# Patient Record
Sex: MALE | Race: WHITE | NOT HISPANIC OR LATINO | Employment: FULL TIME | URBAN - METROPOLITAN AREA
[De-identification: names, ages, dates, MRNs, and addresses within clinical notes are randomized per-mention and may not be internally consistent; named-entity substitution may affect disease eponyms.]

---

## 2018-08-13 ENCOUNTER — CLINICAL SUPPORT (OUTPATIENT)
Dept: INTERNAL MEDICINE | Facility: CLINIC | Age: 18
End: 2018-08-13

## 2018-08-13 VITALS
DIASTOLIC BLOOD PRESSURE: 63 MMHG | WEIGHT: 278 LBS | SYSTOLIC BLOOD PRESSURE: 136 MMHG | HEART RATE: 70 BPM | BODY MASS INDEX: 36.84 KG/M2 | HEIGHT: 73 IN

## 2018-08-13 DIAGNOSIS — Z00.00 ROUTINE GENERAL MEDICAL EXAMINATION AT A HEALTH CARE FACILITY: Primary | ICD-10-CM

## 2018-08-13 PROCEDURE — 94010 BREATHING CAPACITY TEST: CPT | Mod: ,,, | Performed by: NURSE PRACTITIONER

## 2018-08-15 NOTE — PROCEDURES
DATE OF PROCEDURE:  08/13/2018.    REFERRING EXAMINER:  Rickie POPE at [facility]    Spirometry shows an FVC of 116% and FEV1 of 116% and the FEV1/FVC of 99%  and the [id number] of 108%.    CONCLUSION:  Normal spirometry.        STEPHEN/PEYMAN dd: 08/15/2018 06:24:01 (CDT)   td: 08/15/2018 06:58:39 (CDT)  Doc ID #[id number]   Job ID #[id number]    CC:

## 2018-09-07 DIAGNOSIS — Z00.00 ROUTINE GENERAL MEDICAL EXAMINATION AT A HEALTH CARE FACILITY: Primary | ICD-10-CM

## 2018-09-12 ENCOUNTER — CLINICAL SUPPORT (OUTPATIENT)
Dept: INTERNAL MEDICINE | Facility: CLINIC | Age: 18
End: 2018-09-12

## 2018-09-12 DIAGNOSIS — Z00.00 ROUTINE GENERAL MEDICAL EXAMINATION AT A HEALTH CARE FACILITY: ICD-10-CM

## 2018-09-12 LAB
ALBUMIN SERPL BCP-MCNC: 4.8 G/DL
ALP SERPL-CCNC: 54 U/L
ALT SERPL W/O P-5'-P-CCNC: 29 U/L
ANION GAP SERPL CALC-SCNC: 9 MMOL/L
AST SERPL-CCNC: 26 U/L
BASOPHILS # BLD AUTO: 0.06 K/UL
BASOPHILS NFR BLD: 0.9 %
BILIRUB SERPL-MCNC: 0.3 MG/DL
BILIRUB UR QL STRIP: NEGATIVE
BUN SERPL-MCNC: 14 MG/DL
CALCIUM SERPL-MCNC: 9.6 MG/DL
CHLORIDE SERPL-SCNC: 102 MMOL/L
CHOLEST SERPL-MCNC: 194 MG/DL
CHOLEST/HDLC SERPL: 5.2 {RATIO}
CLARITY UR: CLEAR
CO2 SERPL-SCNC: 27 MMOL/L
COLOR UR: YELLOW
CREAT SERPL-MCNC: 1 MG/DL
DIFFERENTIAL METHOD: NORMAL
EOSINOPHIL # BLD AUTO: 0.2 K/UL
EOSINOPHIL NFR BLD: 3.6 %
ERYTHROCYTE [DISTWIDTH] IN BLOOD BY AUTOMATED COUNT: 13.1 %
EST. GFR  (AFRICAN AMERICAN): >60 ML/MIN/1.73 M^2
EST. GFR  (NON AFRICAN AMERICAN): >60 ML/MIN/1.73 M^2
GLUCOSE SERPL-MCNC: 95 MG/DL
GLUCOSE UR QL STRIP: NEGATIVE
HCT VFR BLD AUTO: 47.2 %
HDLC SERPL-MCNC: 37 MG/DL
HDLC SERPL: 19.1 %
HGB BLD-MCNC: 15.5 G/DL
HGB UR QL STRIP: NEGATIVE
IMM GRANULOCYTES # BLD AUTO: 0.03 K/UL
IMM GRANULOCYTES NFR BLD AUTO: 0.5 %
KETONES UR QL STRIP: NEGATIVE
LDLC SERPL CALC-MCNC: 144.4 MG/DL
LEUKOCYTE ESTERASE UR QL STRIP: NEGATIVE
LYMPHOCYTES # BLD AUTO: 1.6 K/UL
LYMPHOCYTES NFR BLD: 24.9 %
MCH RBC QN AUTO: 27.3 PG
MCHC RBC AUTO-ENTMCNC: 32.8 G/DL
MCV RBC AUTO: 83 FL
MONOCYTES # BLD AUTO: 0.7 K/UL
MONOCYTES NFR BLD: 10.5 %
NEUTROPHILS # BLD AUTO: 3.9 K/UL
NEUTROPHILS NFR BLD: 59.6 %
NITRITE UR QL STRIP: NEGATIVE
NONHDLC SERPL-MCNC: 157 MG/DL
NRBC BLD-RTO: 0 /100 WBC
PH UR STRIP: 6 [PH] (ref 5–8)
PLATELET # BLD AUTO: 262 K/UL
PMV BLD AUTO: 10.9 FL
POTASSIUM SERPL-SCNC: 4.7 MMOL/L
PROT SERPL-MCNC: 7 G/DL
PROT UR QL STRIP: NEGATIVE
RBC # BLD AUTO: 5.67 M/UL
SODIUM SERPL-SCNC: 138 MMOL/L
SP GR UR STRIP: 1.02 (ref 1–1.03)
TRIGL SERPL-MCNC: 63 MG/DL
URN SPEC COLLECT METH UR: NORMAL
UROBILINOGEN UR STRIP-ACNC: NEGATIVE EU/DL
WBC # BLD AUTO: 6.59 K/UL

## 2018-09-12 PROCEDURE — 80053 COMPREHEN METABOLIC PANEL: CPT

## 2018-09-12 PROCEDURE — 99212 OFFICE O/P EST SF 10 MIN: CPT | Mod: PN

## 2018-09-12 PROCEDURE — 80061 LIPID PANEL: CPT

## 2018-09-12 PROCEDURE — 81003 URINALYSIS AUTO W/O SCOPE: CPT

## 2018-09-12 PROCEDURE — 92551 PURE TONE HEARING TEST AIR: CPT | Mod: ,,, | Performed by: NURSE PRACTITIONER

## 2018-09-12 PROCEDURE — 85025 COMPLETE CBC W/AUTO DIFF WBC: CPT

## 2018-09-25 ENCOUNTER — CLINICAL SUPPORT (OUTPATIENT)
Dept: INTERNAL MEDICINE | Facility: CLINIC | Age: 18
End: 2018-09-25

## 2018-09-25 VITALS
HEART RATE: 78 BPM | DIASTOLIC BLOOD PRESSURE: 69 MMHG | BODY MASS INDEX: 35.52 KG/M2 | HEIGHT: 73 IN | SYSTOLIC BLOOD PRESSURE: 137 MMHG | WEIGHT: 268 LBS | OXYGEN SATURATION: 98 %

## 2018-09-25 DIAGNOSIS — Z02.89 ENCOUNTER FOR PHYSICAL EXAMINATION RELATED TO EMPLOYMENT: Primary | ICD-10-CM

## 2018-09-25 PROCEDURE — 99499 UNLISTED E&M SERVICE: CPT | Mod: ,,, | Performed by: NURSE PRACTITIONER

## 2018-09-25 NOTE — PROGRESS NOTES
Pre-Employment or Annual Employment Physical > [facility] > See Handwritten Documentation Scanned Into Chart